# Patient Record
Sex: FEMALE | Race: WHITE | Employment: OTHER | URBAN - METROPOLITAN AREA
[De-identification: names, ages, dates, MRNs, and addresses within clinical notes are randomized per-mention and may not be internally consistent; named-entity substitution may affect disease eponyms.]

---

## 2017-04-13 ENCOUNTER — DOCUMENTATION ONLY (OUTPATIENT)
Dept: SLEEP MEDICINE | Age: 67
End: 2017-04-13

## 2017-04-13 ENCOUNTER — TELEPHONE (OUTPATIENT)
Dept: SLEEP MEDICINE | Age: 67
End: 2017-04-13

## 2017-04-13 NOTE — TELEPHONE ENCOUNTER
Pt agreed to come to our office for consult on 5/15/17. She was told, again, once we receive order and necessary documents from [facility], we will get her scheduled for testing. New patient packet mailed to pt on 04/13/17.

## 2017-04-13 NOTE — PROGRESS NOTES
Pt called and she needs sleep study. However, she is coming from [facility] and has already had consultation. I've faxed direct order form to drs office and we are waiting for that office to fax over demographics/insurance and medical records so that we can start the pre study checklist.  Pt would like to be seen before June 1, 2017.

## 2017-04-24 ENCOUNTER — OFFICE VISIT (OUTPATIENT)
Dept: SLEEP MEDICINE | Age: 67
End: 2017-04-24

## 2017-04-24 ENCOUNTER — TELEPHONE (OUTPATIENT)
Dept: SLEEP MEDICINE | Age: 67
End: 2017-04-24

## 2017-04-24 VITALS
DIASTOLIC BLOOD PRESSURE: 77 MMHG | WEIGHT: 142 LBS | BODY MASS INDEX: 22.82 KG/M2 | HEART RATE: 70 BPM | TEMPERATURE: 97.9 F | OXYGEN SATURATION: 98 % | HEIGHT: 66 IN | SYSTOLIC BLOOD PRESSURE: 133 MMHG

## 2017-04-24 DIAGNOSIS — G47.10 HYPERSOMNIA: Primary | ICD-10-CM

## 2017-04-24 DIAGNOSIS — G45.9 TRANSIENT CEREBRAL ISCHEMIA, UNSPECIFIED TYPE: ICD-10-CM

## 2017-04-24 DIAGNOSIS — G47.419 NARCOLEPSY WITHOUT CATAPLEXY(347.00): ICD-10-CM

## 2017-04-24 RX ORDER — SUMATRIPTAN 100 MG/1
100 TABLET, FILM COATED ORAL
COMMUNITY

## 2017-04-24 NOTE — TELEPHONE ENCOUNTER
pt refuses to sign medical release form, the form was given to her . PSG/MSLT date was set on June 7 and June 8.

## 2017-04-24 NOTE — PROGRESS NOTES
[address] [facility] [place], [address]  Tel.  [phone]  Fax. 100 [facility] 60  [place], [address] [facility]  Tel.  [phone]  Fax. [phone] [address] [place]  Tel.  [phone]  Fax. [phone]         Subjective:      Kerry Carlin is an 77 y.o. female referred for evaluation for a sleep disorder. She complains of excessive daytime sleepiness associated with feeling slow and having some memory trouble at times. Symptoms began 4 months ago, gradually worsening since that time. She usually can fall asleep in 10 minutes. Family or house members note no significant snoring. She denies tossing and turning, falling asleep while driving, eating, during conversation. She says when she awakens she feels groggy and feels like she will get a headache. She feels this all day. She saw a different sleepy physician at [facility] but was not able to schedule a sleep study for 5 months. She spends summers in [place], and leaves June 4th. Records not available at time of consultation. she has a history of anemia but says her iron levels have been good. Kerry Carlin does not wake up frequently at night. She is not bothered by waking up too early and left unable to get back to sleep. She actually sleeps about 8 hours at night and wakes up about 3 times during the night. She does not work shifts:  . Vikas Gunter indicates she does not get too little sleep at night. Her bedtime is 2315. She awakens at 0700. She does not take naps. . She has the following observed behaviors:  ;  .  Other remarks: vivid dreams  She feels her sleep quality is good. However, she never feels rested. She describes herself as sleepy and not tired. She further explains that her body is not physically tired. She can hike 4 miles and goes on long bike rides. She denies falling asleep at inappropriate times. She says she gets migraines once a month.  She says she has had 4 TIAs but does not follow with a neurologist. Her first TIA was when she was 15. She describes this as a short time when she cannot think or speak followed by complete resolution of symptoms. Last episode was 8 years ago. [place] Sleepiness Score: 3        No Known Allergies      Current Outpatient Prescriptions:     SUMAtriptan (IMITREX) 100 mg tablet, Take 100 mg by mouth once as needed for Migraine. , Disp: , Rfl:     VIT A/VIT C/VIT E/ZINC/COPPER (PRESERVISION AREDS PO), Take  by mouth., Disp: , Rfl:     CHOLECALCIFEROL, VITAMIN D3, (VITAMIN D3 PO), Take  by mouth., Disp: , Rfl:     FERROUS SULFATE, DRIED (IRON, DRIED, PO), Take  by mouth., Disp: , Rfl:      She  has a past medical history of Migraine and Stroke ([facility] [address] anemia on iron replacement    She  has a past surgical history that includes tonsillectomy; orthopaedic; and gyn. She family history includes Diabetes in her mother; Heart Disease in her father and mother; Hypertension in her father and mother; Stroke in her mother. She  reports that she has never smoked. She does not have any smokeless tobacco history on file. She reports that she does not drink alcohol. Review of Systems:  Constitutional:  No significant weight loss or weight gain. Eyes:  No blurred vision. CVS:  No significant chest pain  Pulm:  No significant shortness of breath  GI:  No significant nausea or vomiting  :  No significant nocturia  Musculoskeletal:  No significant joint pain at night  Skin:  No significant rashes  Neuro:  No significant dizziness , history of 4 TIAs in past, since age 13. She describes short periods during which she cannot speak or think. She saw a neurologist in the 60's after first episode but not since. Psych:  No active mood issues, she denies any anxiety.      Sleep Review of Systems: notable for no difficulty falling asleep; infrequent awakenings at night; some dreaming noted; no nightmares ; no early morning headaches; mild memory problems; no concentration issues; no history of any automobile or occupational accidents due to daytime drowsiness. Objective:     Visit Vitals    /77    Pulse 70    Temp 97.9 °F (36.6 °C)    Ht 5' 5.5\" (1.664 m)    Wt 142 lb (64.4 kg)    SpO2 98%    BMI 23.27 kg/m2         General:   Not in acute distress   Eyes:  Anicteric sclerae, no obvious strabismus   Nose:  No obvious nasal septum deviation    Oropharynx:   Class 2 oropharyngeal outlet,    Tonsils:   tonsils are absent   Neck:   Neck circ. in \"inches\": 11; midline trachea   Chest/Lungs:  Equal lung expansion, clear on auscultation    CVS:  Normal rate, regular rhythm; no JVD, soft murmur best heard aortic area   Skin:  Warm to touch; no obvious rashes   Neuro:  No focal deficits ; no obvious tremor    Psych:  Normal affect,  normal countenance;          Assessment:       ICD-10-CM ICD-9-CM    1. Hypersomnia G47.10 780.54 POLYSOMNOGRAPHY (MSLT)      POLYSOMNOGRAPHY 1 NIGHT      CANCELED: POLYSOMNOGRAPHY 1 NIGHT   2. 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 Transient cerebral ischemia, unspecified type G45.9 435.9 REFERRAL TO NEUROLOGY         Plan:     * The patient currently has a Low Risk for having sleep apnea. STOP-BANG score 2.  * PSG  MSLT was ordered for initial evaluation. We will follow the American Academy of Sleep Medicine protocol regarding split-night procedures and offering a trial of Positive Airway Pressure (CPAP, BPAP, etc.)  * She was provided information on sleep apnea including coresponding risk factors and the importance of proper treatment. * Counseling was provided regarding proper sleep hygiene and safe driving. We are requesting her recent sleep medicine records. We talked about narcolepsy and primary hypersomnia. She was advised to have a cardiology evaluation done for the murmur. I encouraged her to set that up.  We disucssed that should she be found to have signicant hypersomnia and we consider a stimulant such as provigil, she should have had cardiac evaluation done prior to starting it. We discussed adverse effects of provigil. * Return for follow-up shortly after sleep study to go over results and to discuss treatment options if indicated. 2. TIA  I advised further neurology evaluation to be done. I have referred her to Dr. Marzena Zapata office.      Raoul Vaughn MD  Diplomate in Sleep Medicine  [facility]

## 2017-04-24 NOTE — PATIENT INSTRUCTIONS
[address] [place], [address]  Tel.  [phone]  Fax. 100 [facility] 60  [place], [address] [facility]  Tel.  [phone]  Fax. [phone] [address] [facility] [address] [place]  Tel.  [phone]  Fax. [phone]     PROPER SLEEP HYGIENE    What to avoid  · Do not have drinks with caffeine, such as coffee or black tea, for 8 hours before bed. · Do not smoke or use other types of tobacco near bedtime. Nicotine is a stimulant and can keep you awake. · Avoid drinking alcohol late in the evening, because it can cause you to wake in the middle of the night. · Do not eat a big meal close to bedtime. If you are hungry, eat a light snack. · Do not drink a lot of water close to bedtime, because the need to urinate may wake you up during the night. · Do not read or watch TV in bed. Use the bed only for sleeping and sexual activity. What to try  · Go to bed at the same time every night, and wake up at the same time every morning. Do not take naps during the day. · Keep your bedroom quiet, dark, and cool. · Get regular exercise, but not within 3 to 4 hours of your bedtime. .  · Sleep on a comfortable pillow and mattress. · If watching the clock makes you anxious, turn it facing away from you so you cannot see the time. · If you worry when you lie down, start a worry book. Well before bedtime, write down your worries, and then set the book and your concerns aside. · Try meditation or other relaxation techniques before you go to bed. · If you cannot fall asleep, get up and go to another room until you feel sleepy. Do something relaxing. Repeat your bedtime routine before you go to bed again. · Make your house quiet and calm about an hour before bedtime. Turn down the lights, turn off the TV, log off the computer, and turn down the volume on music. This can help you relax after a busy day.     Drowsy Driving  The [address] [place] [address] Road Traffic Safety Administration cites drowsiness as a causing factor in more than 829,131 police reported crashes annually, resulting in 76,000 injuries and 1,500 deaths. Other surveys suggest 55% of people polled have driven while drowsy in the past year, 23% had fallen asleep but not crashed, 3% crashed, and 2% had and accident due to drowsy driving. Who is at risk? Young Drivers: One study of drowsy driving accidents states that 55% of the drivers were under 25 years. Of those, 75% were male. Shift Workers and Travelers: People who work overnight or travel across time zones frequently are at higher risk of experiencing Circadian Rhythm Disorders. They are trying to work and function when their body is programed to sleep. Sleep Deprived: Lack of sleep has a serious impact on your ability to pay attention or focus on a task. Consistently getting less than the average of 8 hours your body needs creates partial or cumulative sleep deprivation. Untreated Sleep Disorders: Sleep Apnea, Narcolepsy, R.L.S., and other sleep disorders (untreated) prevent a person from getting enough restful sleep. This leads to excessive daytime sleepiness and increases the risk for drowsy driving accidents by up to 7 times. Medications / Alcohol: Even over the counter medications can cause drowsiness. Medications that impair a drivers attention should have a warning label. Alcohol naturally makes you sleepy and on its own can cause accidents. Combined with excessive drowsiness its effects are amplified. Signs of Drowsy Driving:   * You don't remember driving the last few miles   * You may drift out of your terrance   * You are unable to focus and your thoughts wander   * You may yawn more often than normal   * You have difficulty keeping your eyes open / nodding off   * Missing traffic signs, speeding, or tailgating  Prevention-   Good sleep hygiene, lifestyle and behavioral choices have the most impact on drowsy driving.  There is no substitute for sleep and the average person requires 8 hours nightly. If you find yourself driving drowsy, stop and sleep. Consider the sleep hygiene tips provided during your visit as well. Medication Refill Policy: Refills for all medications require 1 week advance notice. Please have your pharmacy fax a refill request. We are unable to fax, or call in \"controled substance\" medications and you will need to pick these prescriptions up from our office. [facility] Activation    Thank you for requesting access to [facility]. Please follow the instructions below to securely access and download your online medical record. [facility] allows you to send messages to your doctor, view your test results, renew your prescriptions, schedule appointments, and more. How Do I Sign Up? 1. In your internet browser, go to [email or web address]. 2. Click on the First Time User? Click Here link in the Sign In box. You will see the New Member Sign Up page. 3. Enter your [facility] Access Code exactly as it appears below. You will not need to use this code after youve completed the sign-up process. If you do not sign up before the expiration date, you must request a new code. [facility] Access Code: [email or web address]  Expires: 2017 10:08 AM (This is the date your [facility] access code will )    4. Enter the last four digits of your Social Security Number (xxxx) and Date of Birth (mm/dd/yyyy) as indicated and click Submit. You will be taken to the next sign-up page. 5. Create a [facility] ID. This will be your [facility] login ID and cannot be changed, so think of one that is secure and easy to remember. 6. Create a [facility] password. You can change your password at any time. 7. Enter your Password Reset Question and Answer. This can be used at a later time if you forget your password. 8. Enter your e-mail address. You will receive e-mail notification when new information is available in [address] 9. Click Sign Up.  You can now view and download portions of your medical record. 10. Click the Download Summary menu link to download a portable copy of your medical information. Additional Information    If you have questions, please call [phone]. Remember, [facility] [employer] is NOT to be used for urgent needs. For medical emergencies, dial 911.

## 2017-04-27 ENCOUNTER — OFFICE VISIT (OUTPATIENT)
Dept: NEUROLOGY | Age: 67
End: 2017-04-27

## 2017-04-27 VITALS
DIASTOLIC BLOOD PRESSURE: 86 MMHG | WEIGHT: 142 LBS | HEIGHT: 66 IN | SYSTOLIC BLOOD PRESSURE: 138 MMHG | BODY MASS INDEX: 22.82 KG/M2 | OXYGEN SATURATION: 97 % | HEART RATE: 70 BPM

## 2017-04-27 DIAGNOSIS — G43.109 COMPLICATED MIGRAINE: ICD-10-CM

## 2017-04-27 DIAGNOSIS — G43.009 MIGRAINE WITHOUT AURA AND WITHOUT STATUS MIGRAINOSUS, NOT INTRACTABLE: Primary | ICD-10-CM

## 2017-04-27 RX ORDER — IBUPROFEN 200 MG
CAPSULE ORAL DAILY
COMMUNITY

## 2017-04-27 NOTE — PROGRESS NOTES
Name: Lexi LEWIS      :  1950    PCP:   Kandi Roblero MD      Referring:  Julia Baird MD/ Sleep Medicine  MRN:   [id number]    Chief Complaint:   Chief Complaint   Patient presents with    TIA    Headache    Fatigue       HISTORY OF PRESENT ILLNESS:     This is a 77 y.o. female who presents for evaluation of migraine and possible TIAs. Describes having a total of 4 possible TIA-like spells. First one occurred at 12 yo and was at home coming down some stairs. Says was \"babbling, lips were numb and fingertips were tingling/ numbs (hard to say if both hands), spots in vision\" almost immediately had a migraine. Went to see a Neurologist who did EEG from what she remembers and says was dx with migraine. Next spell was around 22years old, coming on a bus coming back from work, was reading something and couldn't understand what she was reading. Had confusion, couldn't get her words out, all lasting 20 minutes, followed by a some kind of headache within an hour (not sure it was a migraine). Had two other spells like the one at 22years old, last one being in . Other than that has not had any episodes of facial droop, slurred speech, prolonged extremity weakness or numbness. Throughout the years she has had migraine about 2 time in a month and that responds well to imitrex tablet. No aura visual or otherwise with her common migraines. Complete Review of Systems: reviewed on intake H&P; refer to media section; otherwise as noted in HPI     No Known Allergies  Past Medical History:   Diagnosis Date    Migraine     Stroke [facility])     history of TIA     Current Outpatient Prescriptions   Medication Sig Dispense Refill    calcium citrate 200 mg (950 mg) tablet Take  by mouth daily.  SUMAtriptan (IMITREX) 100 mg tablet Take 100 mg by mouth once as needed for Migraine.  VIT A/VIT C/VIT E/ZINC/COPPER (PRESERVISION AREDS PO) Take  by mouth.       CHOLECALCIFEROL, VITAMIN D3, (VITAMIN D3 PO) Take  by mouth.  FERROUS SULFATE, DRIED (IRON, DRIED, PO) Take  by mouth. Past Surgical History:   Procedure Laterality Date    HX GYN      ovarian cyst removal    HX ORTHOPAEDIC      rotator cuff    HX TONSILLECTOMY       Family History   Problem Relation Age of Onset    Hypertension Mother     Heart Disease Mother     Diabetes Mother     Stroke Mother     Hypertension Father     Heart Disease Father      Social History     Social History    Marital status:      Spouse name: N/A    Number of children: N/A    Years of education: N/A     Occupational History    Not on file. Social History Main Topics    Smoking status: Never Smoker    Smokeless tobacco: Not on file    Alcohol use No    Drug use: Not on file    Sexual activity: Not on file     Other Topics Concern    Not on file     Social History Narrative       PHYSICAL EXAM  Vitals:    04/27/17 1310   BP: 138/86   BP 1 Location: Left arm   BP Patient Position: Sitting   Pulse: 70   SpO2: 97%   Weight: 64.4 kg (142 lb)   Height: 5' 5.5\" (1.664 m)     General:  Alert, cooperative, NAD   Head:  Normocephalic, atraumatic. Eyes:  Conjunctivae/corneas clear   Lungs:  Heart:  Non labored breathing  Regular rate, rhythm   Extremities: No edema.    Skin: No rashes    Neurologic Exam       Language: normal  Memory:  Alert, oriented to person, place, situation    Cranial Nerves:  I: smell Not tested   II: visual fields Full to confrontation   II: pupils Equal, round, reactive to light   II: optic disc No papilledema   III,VII: ptosis none   III,IV,VI: extraocular muscles  normal   V: facial light touch sensation  normal   VII: facial muscle function  symmetric   VIII: hearing symmetric   IX: soft palate elevation  normal   XI: sternocleidomastoid strength 5/5   XII: tongue  midline      Motor: normal bulk, tone, strength in all exts  Sensory: intact to LT, PP, temp, vibration x 4 exts   Cerebellar: no rest, postural, or intention tremor  Normal FNF and H-Shin bilaterally  Reflexes: 2+ biceps, patellars, and achilles bilaterally   Plantar response: neutral bilaterally    Gait: normal gait including tandem  Romberg negative     Reviewed last clinic note by Dr. Lolly Perrin    1. Migraine without aura and without status migrainosus, not intractable G43.009 346.10    2. Complicated migraine S47.974 346.00        77 y.o. female with hx of chronic, episodic migraine without aura, responds well to Imitrex tablet. Discussed with patient that the 4 episodes in the past that she described where she had other symptoms prior to the onset of migraine are more consistent with complicated migraine, not TIA. No additional neurolgoy evaluation (i.e neuroimaging) needed for this issue. Advised her that if she were to get another one of those spells she should take her Imitrex at that time as it may abort the spell as well as the following headache. Lastly, pt expressed concerns about having extreme sleepiness despite getting adequate sleep. She is being evaluated by Sleep Medicine for this issue so I told the patient better to follow through with her sleep evaluation and if PCP feels that patient needs to see a Neurologist for the issue then she's welcome to return to discuss that. Thank you for allowing me to be a part of your patient's care. Please call me at [phone] if you have any questions.      Sincerely,  Gina Mendez MD

## 2017-04-27 NOTE — MR AVS SNAPSHOT
Visit Information Date & Time Provider Department Dept. Phone Encounter #  
 4/27/2017  1:00 PM Jamal Louise MD Neurology [facility] [phone] [id number] Follow-up Instructions Return if symptoms worsen or fail to improve. Upcoming Health Maintenance Date Due Hepatitis C Screening 1950 DTaP/Tdap/Td series (1 - Tdap) 5/26/1971 BREAST CANCER SCRN MAMMOGRAM 5/26/2000 FOBT Q 1 YEAR AGE 50-75 5/26/2000 ZOSTER VACCINE AGE 60> 5/26/2010 GLAUCOMA SCREENING Q2Y 5/26/2015 OSTEOPOROSIS SCREENING (DEXA) 5/26/2015 Pneumococcal 65+ Low/Medium Risk (1 of 2 - PCV13) 5/26/2015 MEDICARE YEARLY EXAM 5/26/2015 INFLUENZA AGE 9 TO ADULT 8/1/2016 Allergies as of 4/27/2017  Review Complete On: 4/27/2017 By: Gilma Estevez LPN No Known Allergies Current Immunizations  Never Reviewed No immunizations on file. Not reviewed this visit You Were Diagnosed With   
  
 Codes Comments Migraine without aura and without status migrainosus, not intractable    -  Primary ICD-10-CM: G43.009 ICD-9-CM: 346.10 Complicated migraine     ICD-10-CM: G43.109 ICD-9-CM: 346.00 Vitals BP Pulse Height(growth percentile) Weight(growth percentile) SpO2 BMI  
 138/86 (BP 1 Location: Left arm, BP Patient Position: Sitting) 70 5' 5.5\" (1.664 m) 142 lb (64.4 kg) 97% 23.27 kg/m2 Smoking Status Never Smoker BMI and BSA Data Body Mass Index Body Surface Area  
 23.27 kg/m 2 1.73 m 2 Your Updated Medication List  
  
   
This list is accurate as of: 4/27/17  1:43 PM.  Always use your most recent med list.  
  
  
  
  
 calcium citrate 200 mg (950 mg) tablet Take  by mouth daily. IRON (DRIED) PO Take  by mouth. PRESERVISION AREDS PO Take  by mouth. SUMAtriptan 100 mg tablet Commonly known as:  IMITREX Take 100 mg by mouth once as needed for Migraine.   
  
 VITAMIN D3 PO  
 Take  by mouth. Follow-up Instructions Return if symptoms worsen or fail to improve. To-Do List   
 06/07/2017 8:00 PM  
  Appointment with BEDROOM R Rosamaria Holley 115 at [facility] ([phone]) 1. Do not take a nap the day of the study  2. No caffeine after 12 noon the day of the study  3. Bring a 2 piece sleeping garment  4. Hair should be clean and dry, no oils, sprays, powders and remove wigs, weaves or other hair accessories  6. Patient should eat dinner prior to arriving for the test and a light breakfast will be provided upon discharge in the morning  7. Patient encouraged to bring activity items such as books, magazines, laptop, IPAD, etc, as well as toiletry items needed for the next morning  8. Bring all medications with you to the center  9. For specific questions please contact the sleep center directly, [phone] to [phone]  10. Do not arrive more than 15 minutes prior to appt time and use the doorbell to enter the sleep center. 06/08/2017 7:00 AM  
  Appointment with BEDROOM 5 Riri 54 at [facility] ([phone]) 1. For all locations arrive 15 minutes prior to appointment time and use the doorbell to enter the sleep center  2. The night prior to the study, patient should get a full night's sleep. 3. Do not consume caffeine after midnight prior to your appointment. 4. Eat a light breakfast (no caffeine) 1 hour prior to your scheduled appointment. 5. Lunch will be provided by the sleep center  6. Wear comfortable clothes. No pajamas. 7. Hair should be clean and dry, no oils, sprays, powders and remove wigs, weaves or other hair accessories. No lotions on face. 8. Patient may bring books, magazines, computer/tablet and any toiletry items needed to freshen up prior to discharge. 9. Bring all medications with you to the sleep center.   Inform technologist prior to taking any medications during your visit. 10.  Patient should be prepared to stay at the sleep center for the entire study. Discharge is approximately 5:00pm.  11. For specific questions, please contact the sleep center directly [place] 8:30am-5:00pm.  
  
  
Introducing [facility]! [employer] introduces ViVu patient portal. Now you can access parts of your medical record, email your doctor's office, and request medication refills online. 1. In your internet browser, go to [email or web address]. [email or web address] 2. Click on the First Time User? Click Here link in the Sign In box. You will see the New Member Sign Up page. 3. Enter your ViVu Access Code exactly as it appears below. You will not need to use this code after youve completed the sign-up process. If you do not sign up before the expiration date, you must request a new code. · ViVu Access Code: [email or web address] Expires: 7/23/2017 10:08 AM 
 
4. Enter the last four digits of your Social Security Number (xxxx) and Date of Birth (mm/dd/yyyy) as indicated and click Submit. You will be taken to the next sign-up page. 5. Create a ViVu ID. This will be your ViVu login ID and cannot be changed, so think of one that is secure and easy to remember. 6. Create a ViVu password. You can change your password at any time. 7. Enter your Password Reset Question and Answer. This can be used at a later time if you forget your password. 8. Enter your e-mail address. You will receive e-mail notification when new information is available in [address]. 9. Click Sign Up. You can now view and download portions of your medical record. 10. Click the Download Summary menu link to download a portable copy of your medical information. If you have questions, please visit the Frequently Asked Questions section of the ViVu website. Remember, ViVu is NOT to be used for urgent needs. For medical emergencies, dial 911. Now available from your iPhone and Android! Please provide this summary of care documentation to your next provider. Your primary care clinician is listed as Jania Mota. If you have any questions after today's visit, please call [phone].

## 2017-05-17 ENCOUNTER — HOSPITAL ENCOUNTER (OUTPATIENT)
Dept: SLEEP MEDICINE | Age: 67
Discharge: HOME | End: 2017-05-17
Payer: COMMERCIAL

## 2017-05-17 VITALS
HEART RATE: 66 BPM | DIASTOLIC BLOOD PRESSURE: 76 MMHG | TEMPERATURE: 98 F | OXYGEN SATURATION: 97 % | WEIGHT: 143 LBS | BODY MASS INDEX: 22.98 KG/M2 | SYSTOLIC BLOOD PRESSURE: 146 MMHG | HEIGHT: 66 IN

## 2017-05-17 DIAGNOSIS — G47.419 NARCOLEPSY WITHOUT CATAPLEXY(347.00): ICD-10-CM

## 2017-05-17 DIAGNOSIS — G47.10 HYPERSOMNIA: ICD-10-CM

## 2017-05-17 PROCEDURE — 95810 POLYSOM 6/> YRS 4/> PARAM: CPT | Performed by: INTERNAL MEDICINE

## 2017-05-18 ENCOUNTER — HOSPITAL ENCOUNTER (OUTPATIENT)
Dept: SLEEP MEDICINE | Age: 67
Discharge: HOME | End: 2017-05-18
Payer: COMMERCIAL

## 2017-05-18 DIAGNOSIS — G47.10 HYPERSOMNIA: ICD-10-CM

## 2017-05-18 DIAGNOSIS — G47.419 NARCOLEPSY WITHOUT CATAPLEXY(347.00): ICD-10-CM

## 2017-05-18 PROCEDURE — 95805 MULTIPLE SLEEP LATENCY TEST: CPT | Performed by: INTERNAL MEDICINE

## 2017-05-19 NOTE — TELEPHONE ENCOUNTER
Pt would like results over the phone. She was unable to come in on 6/22 and she will be out of town until September.

## 2017-05-20 LAB
AMPHETAMINES UR QL SCN: NEGATIVE NG/ML
BARBITURATES UR QL SCN: NEGATIVE NG/ML
BENZODIAZ UR QL: NEGATIVE NG/ML
BZE UR QL: NEGATIVE NG/ML
CANNABINOIDS UR QL SCN: NEGATIVE NG/ML
DRUG SCREEN COMMENT:, 753798: NORMAL
OPIATES UR QL: NEGATIVE NG/ML
PCP UR QL: NEGATIVE NG/ML

## 2017-05-22 ENCOUNTER — TELEPHONE (OUTPATIENT)
Dept: SLEEP MEDICINE | Age: 67
End: 2017-05-22

## 2017-05-22 NOTE — TELEPHONE ENCOUNTER
Pt would like a call back reagarding her sleep study. she has called several times leaving 2 voicemails.  Her can be reach is [phone]

## 2017-05-22 NOTE — TELEPHONE ENCOUNTER
I reviewed her study results. She had an average sleep latency of close to 15 minutes. Her psg was negative for sleep disorder. Poor sleep efficiency and 5 hour sleep duration likely caused her to be sleepier than normal for her MSLT, (she said she was surprised that she fell asleep during the test. .   I have advised further neurological evaluation for the sensation behind her eyes. I also recommended she obtain a 2 week actigraphy. She will likely do this in [place]. She is having her cardiology evaluation now. She is scheduled for an echo. She agrees that she feels more tired and not really likely to fall asleep too fast. All of her questions were addressed.

## 2017-05-24 ENCOUNTER — TELEPHONE (OUTPATIENT)
Dept: NEUROLOGY | Age: 67
End: 2017-05-24

## 2017-05-24 NOTE — TELEPHONE ENCOUNTER
Contacted patient back. She states she was told by Dr Lesli Bond if the sleep study didn't show anything than to call back and he would look into it further. Informed her he is currently out of the office, but will return her call as soon as he responds. She voiced understanding and will call back if any further questions or concerns.

## 2017-05-26 NOTE — TELEPHONE ENCOUNTER
Please see the last few lines of my impression/ plan on patient's clinic note as it relates to her complaint of excessive sleepiness. She should f/u with PCP and if PCP wants her to see Neurology for the sleepiness issue, then she's welcome to make a follow up visit. Thanks.

## 2017-05-31 ENCOUNTER — TELEPHONE (OUTPATIENT)
Dept: NEUROLOGY | Age: 67
End: 2017-05-31

## 2017-05-31 NOTE — TELEPHONE ENCOUNTER
----- Message from Lana Lock sent at 5/31/2017  2:11 PM EDT -----  Regarding: Dr. Jackie Chu is returning a call from Klaus Adame, her best contact number is [phone].

## 2017-05-31 NOTE — TELEPHONE ENCOUNTER
Contacted patient back and advised her to contact her PCP in regards to sleep issues. Patient voiced understanding and will call back if any further questions or concerns.

## 2017-05-31 NOTE — TELEPHONE ENCOUNTER
----- Message from Kirsten Moody sent at 5/30/2017  2:24 PM EDT -----  Regarding: Dr. Susannah Naik  Pt stated that she missed a call from the practice and that she would like for another attempt to be made. Her best contact number is [phone].

## 2017-05-31 NOTE — TELEPHONE ENCOUNTER
Per Dr. Andrew Correa office note: She is being evaluated by Sleep Medicine for this issue so I told the patient better to follow through with her sleep evaluation and if PCP feels that patient needs to see a Neurologist for the issue then she's welcome to return to discuss that. Attempted to contact patient by phone. No answer, left call back number on voice mail.

## 2019-11-30 ENCOUNTER — HOSPITAL ENCOUNTER (EMERGENCY)
Age: 69
Discharge: HOME | End: 2019-11-30
Attending: EMERGENCY MEDICINE
Payer: MEDICARE

## 2019-11-30 ENCOUNTER — APPOINTMENT (OUTPATIENT)
Dept: VASCULAR SURGERY | Age: 69
End: 2019-11-30
Attending: NURSE PRACTITIONER
Payer: MEDICARE

## 2019-11-30 VITALS
OXYGEN SATURATION: 98 % | DIASTOLIC BLOOD PRESSURE: 80 MMHG | SYSTOLIC BLOOD PRESSURE: 156 MMHG | HEART RATE: 59 BPM | TEMPERATURE: 97.5 F | RESPIRATION RATE: 18 BRPM

## 2019-11-30 DIAGNOSIS — R60.0 LEG EDEMA, LEFT: Primary | ICD-10-CM

## 2019-11-30 PROCEDURE — 99283 EMERGENCY DEPT VISIT LOW MDM: CPT

## 2019-11-30 PROCEDURE — 93971 EXTREMITY STUDY: CPT

## 2019-11-30 NOTE — ED TRIAGE NOTES
TRIAGE NOTE:  Patient arrives with c/o left knee swelling x 1 week. Patient reports [facility] and was sent to ER to evaluate for blood clot.

## 2019-12-01 NOTE — ED PROVIDER NOTES
Is a 60-year-old  female with medical history remarkable for migraine and TIA presenting to the emergency department with complaint of left lower extremity swelling and pain for the past week. Patient reports feeling sensation of a walnut rubbing against the popliteal area initially. Has gradually noted swelling to the leg worse with ambulating. Skinny Ok several weeks ago but does not recall injuring the knee. No hx of DVT/PE. Denies prolonged immobility. No extremity numbness. Has taken Advil for pain and applied ice with minimal limited improvement. No fever, headache, sore throat, cough, rhinorrhea, sneezing, SOB, abdominal pain, nausea, vomiting, or urinary complaints.               Past Medical History:   Diagnosis Date    Migraine     Stroke [facility])     history of TIA       Past Surgical History:   Procedure Laterality Date    HX GYN      ovarian cyst removal    HX ORTHOPAEDIC      rotator cuff    HX TONSILLECTOMY           Family History:   Problem Relation Age of Onset    Hypertension Mother     Heart Disease Mother     Diabetes Mother     Stroke Mother     Hypertension Father     Heart Disease Father        Social History     Socioeconomic History    Marital status:      Spouse name: Not on file    Number of children: Not on file    Years of education: Not on file    Highest education level: Not on file   Occupational History    Not on file   Social Needs    Financial resource strain: Not on file    Food insecurity:     Worry: Not on file     Inability: Not on file    Transportation needs:     Medical: Not on file     Non-medical: Not on file   Tobacco Use    Smoking status: Never Smoker   Substance and Sexual Activity    Alcohol use: No    Drug use: Not on file    Sexual activity: Not on file   Lifestyle    Physical activity:     Days per week: Not on file     Minutes per session: Not on file    Stress: Not on file   Relationships    Social connections:     Talks on phone: Not on file     Gets together: Not on file     Attends Oriental orthodox service: Not on file     Active member of club or organization: Not on file     Attends meetings of clubs or organizations: Not on file     Relationship status: Not on file    Intimate partner violence:     Fear of current or ex partner: Not on file     Emotionally abused: Not on file     Physically abused: Not on file     Forced sexual activity: Not on file   Other Topics Concern    Not on file   Social History Narrative    Not on file         ALLERGIES: Patient has no known allergies. Review of Systems   Constitutional: Negative. Negative for chills, fatigue and fever. HENT: Negative. Negative for congestion, ear pain, facial swelling, rhinorrhea, sneezing and sore throat. Respiratory: Negative for cough and shortness of breath. Cardiovascular: Positive for leg swelling (left ). Negative for chest pain. Gastrointestinal: Negative. Negative for abdominal pain, constipation, diarrhea, nausea and vomiting. Genitourinary: Negative for difficulty urinating, frequency and urgency. Musculoskeletal: Positive for arthralgias (left knee). Neurological: Negative for numbness and headaches. All other systems reviewed and are negative. Vitals:    11/30/19 1631   BP: 156/80   Pulse: (!) 59   Resp: 18   Temp: 97.5 °F (36.4 °C)   SpO2: 98%            Physical Exam  Vitals signs and nursing note reviewed. Constitutional:       General: She is not in acute distress. Appearance: She is well-developed. Comments: Well appearing  female in NAD   HENT:      Head: Normocephalic and atraumatic. Left Ear: External ear normal.   Eyes:      Conjunctiva/sclera: Conjunctivae normal.      Pupils: Pupils are equal, round, and reactive to light. Neck:      Musculoskeletal: Normal range of motion and neck supple. Cardiovascular:      Rate and Rhythm: Normal rate and regular rhythm.       Heart sounds: Normal heart sounds. Pulmonary:      Effort: Pulmonary effort is normal. No respiratory distress. Breath sounds: No wheezing. Abdominal:      General: Bowel sounds are normal. There is no distension. Palpations: Abdomen is soft. Tenderness: There is no tenderness. There is no rebound. Musculoskeletal: Normal range of motion. Left knee: She exhibits swelling. She exhibits normal range of motion, no deformity, no laceration, no erythema and normal alignment. Tenderness (popliteal fossa) found. Legs:    Skin:     General: Skin is warm and dry. Findings: No ecchymosis, laceration or lesion. Neurological:      Mental Status: She is alert and oriented to person, place, and time. MDM  Number of Diagnoses or Management Options  Leg edema, left:   Diagnosis management comments: 70 yo  male with complaint of left leg pain and swelling. Concern for possible DVT vs baker's cyst  Plan  Duplex LLE       Amount and/or Complexity of Data Reviewed  Tests in the radiology section of CPT®: ordered and reviewed  Independent visualization of images, tracings, or specimens: yes           Procedures        Progress note    Preliminary duplex - for DVT. Will recommend compression, elevation, NSAIDS and ortho follow-up. [facility] [place]       Patient's results have been reviewed with them. Patient and/or family have verbally conveyed their understanding and agreement of the patient's signs, symptoms, diagnosis, treatment and prognosis and additionally agree to follow up as recommended or return to the Emergency Room should their condition change prior to follow-up. Discharge instructions have also been provided to the patient with some educational information regarding their diagnosis as well a list of reasons why they would want to return to the ER prior to their follow-up appointment should their condition change.  Tammy Wade [place]

## 2019-12-01 NOTE — DISCHARGE INSTRUCTIONS
Patient Education        Leg and Ankle Edema: Care Instructions  Your Care Instructions  Swelling in the legs, ankles, and feet is called edema. It is common after you sit or stand for a while. Long plane flights or car rides often cause swelling in the legs and feet. You may also have swelling if you have to stand for long periods of time at your job. Problems with the veins in the legs (varicose veins) and changes in hormones can also cause swelling. Sometimes the swelling in the ankles and feet is caused by a more serious problem, such as heart failure, infection, blood clots, or liver or kidney disease. Follow-up care is a key part of your treatment and safety. Be sure to make and go to all appointments, and call your doctor if you are having problems. It's also a good idea to know your test results and keep a list of the medicines you take. How can you care for yourself at home? · If your doctor gave you medicine, take it as prescribed. Call your doctor if you think you are having a problem with your medicine. · Whenever you are resting, raise your legs up. Try to keep the swollen area higher than the level of your heart. · Take breaks from standing or sitting in one position. ? Walk around to increase the blood flow in your lower legs. ? Move your feet and ankles often while you stand, or tighten and relax your leg muscles. · Wear support stockings. Put them on in the morning, before swelling gets worse. · Eat a balanced diet. Lose weight if you need to. · Limit the amount of salt (sodium) in your diet. Salt holds fluid in the body and may increase swelling. When should you call for help? Call 911 anytime you think you may need emergency care. For example, call if:    · You have symptoms of a blood clot in your lung (called a pulmonary embolism). These may include:  ? Sudden chest pain. ? Trouble breathing. ?  Coughing up blood.    Call your doctor now or seek immediate medical care if:    · You have signs of a blood clot, such as:  ? Pain in your calf, back of the knee, thigh, or groin. ? Redness and swelling in your leg or groin.     · You have symptoms of infection, such as:  ? Increased pain, swelling, warmth, or redness. ? Red streaks or pus. ? A fever.    Watch closely for changes in your health, and be sure to contact your doctor if:    · Your swelling is getting worse.     · You have new or worsening pain in your legs.     · You do not get better as expected. Where can you learn more? Go to http://mary-daryl.info/. Enter D707 in the search box to learn more about \"Leg and Ankle Edema: Care Instructions. \"  Current as of: June 26, 2019  Content Version: 12.2  © 2730-4714 [employer]. Care instructions adapted under license by [employer] (which disclaims liability or warranty for this information). If you have questions about a medical condition or this instruction, always ask your healthcare professional. [place] [address] any warranty or liability for your use of this information.

## 2019-12-01 NOTE — ED NOTES
Patient has received discharge instructions from ER PA, verbalizes understanding. Ambulatory upon discharge with .